# Patient Record
Sex: FEMALE | Race: WHITE | Employment: OTHER | URBAN - METROPOLITAN AREA
[De-identification: names, ages, dates, MRNs, and addresses within clinical notes are randomized per-mention and may not be internally consistent; named-entity substitution may affect disease eponyms.]

---

## 2021-12-27 RX ORDER — ATORVASTATIN CALCIUM 10 MG/1
10 TABLET, FILM COATED ORAL DAILY
COMMUNITY

## 2021-12-27 RX ORDER — PAROXETINE 10 MG/1
10 TABLET, FILM COATED ORAL EVERY MORNING
COMMUNITY

## 2021-12-27 RX ORDER — ALBUTEROL SULFATE 90 UG/1
2 AEROSOL, METERED RESPIRATORY (INHALATION) EVERY 6 HOURS PRN
COMMUNITY

## 2021-12-27 RX ORDER — MONTELUKAST SODIUM 10 MG/1
10 TABLET ORAL NIGHTLY
COMMUNITY

## 2021-12-27 RX ORDER — OMEPRAZOLE 20 MG/1
20 CAPSULE, DELAYED RELEASE ORAL 2 TIMES DAILY
COMMUNITY

## 2021-12-27 RX ORDER — ALBUTEROL SULFATE 0.63 MG/3ML
1 SOLUTION RESPIRATORY (INHALATION) EVERY 6 HOURS PRN
COMMUNITY
End: 2023-04-04 | Stop reason: ALTCHOICE

## 2022-01-13 ENCOUNTER — OFFICE VISIT (OUTPATIENT)
Dept: UROLOGY | Age: 72
End: 2022-01-13
Payer: MEDICARE

## 2022-01-13 ENCOUNTER — HOSPITAL ENCOUNTER (OUTPATIENT)
Age: 72
Setting detail: SPECIMEN
Discharge: HOME | End: 2022-01-13
Payer: MEDICARE

## 2022-01-13 VITALS
DIASTOLIC BLOOD PRESSURE: 76 MMHG | HEART RATE: 73 BPM | TEMPERATURE: 97.8 F | SYSTOLIC BLOOD PRESSURE: 163 MMHG | WEIGHT: 133 LBS | HEIGHT: 64 IN | BODY MASS INDEX: 22.71 KG/M2

## 2022-01-13 DIAGNOSIS — N39.0 FREQUENT UTI: ICD-10-CM

## 2022-01-13 DIAGNOSIS — R35.1 NOCTURIA: Primary | ICD-10-CM

## 2022-01-13 DIAGNOSIS — N39.41 URGE INCONTINENCE: ICD-10-CM

## 2022-01-13 DIAGNOSIS — R31.29 MICROSCOPIC HEMATURIA: ICD-10-CM

## 2022-01-13 DIAGNOSIS — R35.1 NOCTURIA: ICD-10-CM

## 2022-01-13 DIAGNOSIS — N32.81 OAB (OVERACTIVE BLADDER): ICD-10-CM

## 2022-01-13 LAB
-: ABNORMAL
AMORPHOUS: ABNORMAL
BACTERIA: ABNORMAL
BILIRUBIN URINE: NEGATIVE
CASTS UA: ABNORMAL /LPF
COLOR: YELLOW
COMMENT UA: ABNORMAL
CRYSTALS, UA: ABNORMAL /HPF
EPITHELIAL CELLS UA: ABNORMAL /HPF (ref 0–25)
GLUCOSE URINE: NEGATIVE
KETONES, URINE: NEGATIVE
LEUKOCYTE ESTERASE, URINE: NEGATIVE
MUCUS: ABNORMAL
NITRITE, URINE: NEGATIVE
OTHER OBSERVATIONS UA: ABNORMAL
PH UA: 6 (ref 5–9)
PROTEIN UA: NEGATIVE
RBC UA: ABNORMAL /HPF (ref 0–2)
RENAL EPITHELIAL, UA: ABNORMAL /HPF
SPECIFIC GRAVITY UA: 1.02 (ref 1.01–1.02)
TRICHOMONAS: ABNORMAL
TURBIDITY: CLEAR
URINE HGB: ABNORMAL
UROBILINOGEN, URINE: NORMAL
WBC UA: ABNORMAL /HPF (ref 0–5)
YEAST: ABNORMAL

## 2022-01-13 PROCEDURE — G8400 PT W/DXA NO RESULTS DOC: HCPCS | Performed by: NURSE PRACTITIONER

## 2022-01-13 PROCEDURE — 1036F TOBACCO NON-USER: CPT | Performed by: NURSE PRACTITIONER

## 2022-01-13 PROCEDURE — 3017F COLORECTAL CA SCREEN DOC REV: CPT | Performed by: NURSE PRACTITIONER

## 2022-01-13 PROCEDURE — 1090F PRES/ABSN URINE INCON ASSESS: CPT | Performed by: NURSE PRACTITIONER

## 2022-01-13 PROCEDURE — G8420 CALC BMI NORM PARAMETERS: HCPCS | Performed by: NURSE PRACTITIONER

## 2022-01-13 PROCEDURE — 1123F ACP DISCUSS/DSCN MKR DOCD: CPT | Performed by: NURSE PRACTITIONER

## 2022-01-13 PROCEDURE — 87086 URINE CULTURE/COLONY COUNT: CPT

## 2022-01-13 PROCEDURE — PBSHW PR MEASUREMENT,POST-VOID RESIDUAL VOLUME BY US,NON-IMAGING: Performed by: NURSE PRACTITIONER

## 2022-01-13 PROCEDURE — 0509F URINE INCON PLAN DOCD: CPT | Performed by: NURSE PRACTITIONER

## 2022-01-13 PROCEDURE — G8484 FLU IMMUNIZE NO ADMIN: HCPCS | Performed by: NURSE PRACTITIONER

## 2022-01-13 PROCEDURE — 51798 US URINE CAPACITY MEASURE: CPT | Performed by: NURSE PRACTITIONER

## 2022-01-13 PROCEDURE — 4040F PNEUMOC VAC/ADMIN/RCVD: CPT | Performed by: NURSE PRACTITIONER

## 2022-01-13 PROCEDURE — 81001 URINALYSIS AUTO W/SCOPE: CPT

## 2022-01-13 PROCEDURE — G8427 DOCREV CUR MEDS BY ELIG CLIN: HCPCS | Performed by: NURSE PRACTITIONER

## 2022-01-13 PROCEDURE — 99205 OFFICE O/P NEW HI 60 MIN: CPT | Performed by: NURSE PRACTITIONER

## 2022-01-13 RX ORDER — TROSPIUM CHLORIDE 20 MG/1
20 TABLET, FILM COATED ORAL NIGHTLY
Qty: 30 TABLET | Refills: 3 | Status: SHIPPED | OUTPATIENT
Start: 2022-01-13 | End: 2022-02-24 | Stop reason: SDUPTHER

## 2022-01-13 ASSESSMENT — ENCOUNTER SYMPTOMS
NAUSEA: 0
CONSTIPATION: 0
ABDOMINAL PAIN: 0
WHEEZING: 0
SHORTNESS OF BREATH: 0
VOMITING: 0
APNEA: 0
BACK PAIN: 0
EYE REDNESS: 0
COUGH: 0
COLOR CHANGE: 0

## 2022-01-13 NOTE — PROGRESS NOTES
HPI:        Patient is a 80 y.o. female in no acute distress. She is alert and oriented to person, place, and time. New patient referral from Javier NEWMAN for frequency UTIs. When she develops a UTI she does complain of pain with urination and bladder spasms. Urine cultures reviewed and summarized below:    Urine cultures  2021 - e.coli  11/15/2021 - citrobacter  2021 - e.coli  2021 - e.coli  2021 - e.coli    At baseline she does have frequency every 2 hours, nocturia x3-4 (new since July), and UUI. She denies MARKO. She does wear one pad per day. She denies gross hematuria. She does have daily BMs. She does have one cup of coffee in the mornings. Prior urology history reviewed and summarized below:    1975 cystoscopy (microscopic hematuria    1976 Hysterectomy (uterus and cervix only)    1981 Bladder suspension and cystoscopy by Dr. Queta Sanders    3/1993 Cystoscopy, \"stretched tubes\" by Dr. 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  Bilateral mastectomy and radiation - Dr. Moo Ledezma    1998 Cystoscopy, \"found to have medullary sponge kidney on left\", chronic cystitis, cystocele              Past Medical History:   Diagnosis Date    Acute bronchitis     Allergic rhinitis     Arthritis     Asthma     Hx of gastroesophageal reflux (GERD)     Hypertension     Menopause     Neoplasm     Malignant female breast    Nephrolithiasis      bowel perforation      Past Surgical History:   Procedure Laterality Date    BLADDER SUSPENSION      COLONOSCOPY      x 2 - [place] [facility]    CYSTOSCOPY      FINGER TRIGGER RELEASE      HYSTERECTOMY      KNEE ARTHROSCOPY      MASTECTOMY      OK SONO GUIDE NEEDLE BIOPSY      TUBAL LIGATION      UPPER GASTROINTESTINAL ENDOSCOPY       Outpatient Encounter Medications as of 2022   Medication Sig Dispense Refill    trospium (SANCTURA) 20 MG tablet Take 1 tablet by mouth nightly 30 tablet 3    albuterol (ACCUNEB) 0.63 MG/3ML nebulizer solution Take 1 ampule by nebulization every 6 hours as needed for Wheezing      albuterol sulfate  (90 Base) MCG/ACT inhaler Inhale 2 puffs into the lungs every 6 hours as needed for Wheezing      atorvastatin (LIPITOR) 10 MG tablet Take 10 mg by mouth daily      dilTIAZem HCl ER Coated Beads 360 MG TB24 Take by mouth      montelukast (SINGULAIR) 10 MG tablet Take 10 mg by mouth nightly      omeprazole (PRILOSEC) 20 MG delayed release capsule Take 20 mg by mouth daily      PARoxetine (PAXIL) 10 MG tablet Take 10 mg by mouth every morning       No facility-administered encounter medications on file as of 1/13/2022. Current Outpatient Medications on File Prior to Visit   Medication Sig Dispense Refill    albuterol (ACCUNEB) 0.63 MG/3ML nebulizer solution Take 1 ampule by nebulization every 6 hours as needed for Wheezing      albuterol sulfate  (90 Base) MCG/ACT inhaler Inhale 2 puffs into the lungs every 6 hours as needed for Wheezing      atorvastatin (LIPITOR) 10 MG tablet Take 10 mg by mouth daily      dilTIAZem HCl ER Coated Beads 360 MG TB24 Take by mouth      montelukast (SINGULAIR) 10 MG tablet Take 10 mg by mouth nightly      omeprazole (PRILOSEC) 20 MG delayed release capsule Take 20 mg by mouth daily      PARoxetine (PAXIL) 10 MG tablet Take 10 mg by mouth every morning       No current facility-administered medications on file prior to visit.      Avelox [moxifloxacin], Bactrim [sulfamethoxazole-trimethoprim], Celecoxib, Other, Sulfa antibiotics, and Diclofenac sodium  Family History   Problem Relation Age of Onset    Kidney Disease Father     Heart Disease Father     Dementia Mother     Cancer Paternal Aunt     Cancer Paternal Uncle     Cancer Brother     Other Brother     Cancer Daughter     Breast Cancer Daughter      Social History     Tobacco Use   Smoking Status Never Smoker   Smokeless Tobacco Never Used       Social History     Substance and Sexual Activity   Alcohol Use Not Currently       Review of Systems   Constitutional: Negative for appetite change, chills and fever. Eyes: Negative for redness and visual disturbance. Respiratory: Negative for apnea, cough, shortness of breath and wheezing. Cardiovascular: Negative for chest pain and leg swelling. Gastrointestinal: Negative for abdominal pain, constipation, nausea and vomiting. Genitourinary: Negative for difficulty urinating, dyspareunia, dysuria, enuresis, flank pain, frequency, hematuria, pelvic pain, urgency, vaginal bleeding and vaginal discharge. Musculoskeletal: Negative for back pain, joint swelling and myalgias. Skin: Negative for color change, rash and wound. Neurological: Negative for dizziness, tremors and numbness. Hematological: Negative for adenopathy. Does not bruise/bleed easily. Psychiatric/Behavioral: Negative for sleep disturbance. BP (!) 163/76 (Site: Right Upper Arm, Position: Sitting, Cuff Size: Medium Adult)   Pulse 73   Temp 97.8 °F (36.6 °C)   Ht 5' 4\" (1.626 m)   Wt 133 lb (60.3 kg)   LMP  (LMP Unknown)   BMI 22.83 kg/m²       PHYSICAL EXAM:  Constitutional: Patient resting comfortably, in no acute distress. Neuro: Alert and oriented to person place and time. Cranial nerves grossly intact. Psych: Mood and affect normal.  Skin: Warm, dry  HEENT: normocephalic, atraumatic  Lymphatics: No palpable lymphadenopathy  Lungs: Respiratory effort normal, unlabored  Cardiovascular:  Normal peripheral pulses  Abdomen: Soft, non-tender, non-distended with no organomegaly or palpable masses. : No CVA tenderness. Bladder non-tender and not distended. Pelvic: Deferred    No results found for: BUN  No results found for: CREATININE    ASSESSMENT:   Diagnosis Orders   1. Nocturia  [place] MEASUREMENT,POST-VOID RESIDUAL VOLUME BY US,NON-IMAGING    Urinalysis with Microscopic    Culture, Urine   2.  Urge incontinence  Urinalysis with Microscopic Culture, Urine   3. Frequent UTI  Urinalysis with Microscopic    Culture, Urine   4. Microscopic hematuria  Urinalysis with Microscopic    Culture, Urine   5. OAB (overactive bladder)             PLAN:  We will check a UA C&S due to history of microscopic hematuria. If this is positive for significant hematuria we will proceed with a CT urogram, then she will return for cystoscopy and pelvic. If UA is negative for significant hematuria we will obtain a renal ultrasound and KUB    We will start trospium at night. If she tolerates this medication and has improvement we can consider increasing this to twice per day to also address her urgency and urge incontinence. We will attempt to obtain oncology records and talk to oncologist to determine if we can use vaginal estrogen for UTI prevention    I did urge her to start cranberry extract to prevent urinary tract infections    Drink 80 ounces of water every day    Follow-up in 6 weeks to reevaluate urinary symptoms. Further follow-up will be based on urine results    I did urge her to call our office if she develops any signs and symptoms of a urinary tract infection.

## 2022-01-13 NOTE — PATIENT INSTRUCTIONS
Cranberry extract    Drink 80 ounces of water every day            SURVEY:    You may be receiving a survey from [employer] regarding your visit today. Please complete the survey to enable us to provide the highest quality of care to you and your family. If you cannot score us a very good on any question, please call the office to discuss how we could have made your experience a very good one. Thank you.   Karena

## 2022-01-14 LAB
CULTURE: NORMAL
Lab: NORMAL
SPECIMEN DESCRIPTION: NORMAL

## 2022-01-17 ENCOUNTER — TELEPHONE (OUTPATIENT)
Dept: UROLOGY | Age: 72
End: 2022-01-17

## 2022-01-17 DIAGNOSIS — R31.29 MICROSCOPIC HEMATURIA: Primary | ICD-10-CM

## 2022-01-17 NOTE — TELEPHONE ENCOUNTER
Patient made aware of urine results and recommendation to complete CT and cysto. Patient agreeable to scheduling.

## 2022-01-20 ENCOUNTER — HOSPITAL ENCOUNTER (OUTPATIENT)
Dept: CT IMAGING | Age: 72
Discharge: HOME | End: 2022-01-22
Payer: MEDICARE

## 2022-01-20 ENCOUNTER — HOSPITAL ENCOUNTER (OUTPATIENT)
Age: 72
Discharge: HOME | End: 2022-01-20
Payer: MEDICARE

## 2022-01-20 DIAGNOSIS — R31.29 MICROSCOPIC HEMATURIA: ICD-10-CM

## 2022-01-20 LAB
BUN BLDV-MCNC: 21 MG/DL (ref 8–23)
CREAT SERPL-MCNC: 0.66 MG/DL (ref 0.5–0.9)
GFR AFRICAN AMERICAN: >60 ML/MIN
GFR NON-AFRICAN AMERICAN: >60 ML/MIN
GFR SERPL CREATININE-BSD FRML MDRD: NORMAL ML/MIN/{1.73_M2}
GFR SERPL CREATININE-BSD FRML MDRD: NORMAL ML/MIN/{1.73_M2}

## 2022-01-20 PROCEDURE — 82565 ASSAY OF CREATININE: CPT

## 2022-01-20 PROCEDURE — 6360000004 HC RX CONTRAST MEDICATION: Performed by: NURSE PRACTITIONER

## 2022-01-20 PROCEDURE — 36415 COLL VENOUS BLD VENIPUNCTURE: CPT

## 2022-01-20 PROCEDURE — 74178 CT ABD&PLV WO CNTR FLWD CNTR: CPT

## 2022-01-20 PROCEDURE — 84520 ASSAY OF UREA NITROGEN: CPT

## 2022-01-20 RX ADMIN — IOPAMIDOL 120 ML: 755 INJECTION, SOLUTION INTRAVENOUS at 16:17

## 2022-02-07 ENCOUNTER — PROCEDURE VISIT (OUTPATIENT)
Dept: UROLOGY | Age: 72
End: 2022-02-07
Payer: MEDICARE

## 2022-02-07 VITALS
DIASTOLIC BLOOD PRESSURE: 74 MMHG | WEIGHT: 136 LBS | BODY MASS INDEX: 23.22 KG/M2 | TEMPERATURE: 96.9 F | HEART RATE: 86 BPM | HEIGHT: 64 IN | SYSTOLIC BLOOD PRESSURE: 166 MMHG

## 2022-02-07 DIAGNOSIS — R35.1 NOCTURIA: ICD-10-CM

## 2022-02-07 DIAGNOSIS — N39.41 URGE INCONTINENCE: ICD-10-CM

## 2022-02-07 DIAGNOSIS — R31.29 MICROSCOPIC HEMATURIA: Primary | ICD-10-CM

## 2022-02-07 DIAGNOSIS — N32.81 OAB (OVERACTIVE BLADDER): ICD-10-CM

## 2022-02-07 DIAGNOSIS — N39.0 FREQUENT UTI: ICD-10-CM

## 2022-02-07 PROCEDURE — G8484 FLU IMMUNIZE NO ADMIN: HCPCS | Performed by: UROLOGY

## 2022-02-07 PROCEDURE — 52000 CYSTOURETHROSCOPY: CPT | Performed by: UROLOGY

## 2022-02-07 PROCEDURE — 0509F URINE INCON PLAN DOCD: CPT | Performed by: UROLOGY

## 2022-02-07 PROCEDURE — 3017F COLORECTAL CA SCREEN DOC REV: CPT | Performed by: UROLOGY

## 2022-02-07 PROCEDURE — 1123F ACP DISCUSS/DSCN MKR DOCD: CPT | Performed by: UROLOGY

## 2022-02-07 PROCEDURE — 1090F PRES/ABSN URINE INCON ASSESS: CPT | Performed by: UROLOGY

## 2022-02-07 PROCEDURE — G8420 CALC BMI NORM PARAMETERS: HCPCS | Performed by: UROLOGY

## 2022-02-07 PROCEDURE — G8400 PT W/DXA NO RESULTS DOC: HCPCS | Performed by: UROLOGY

## 2022-02-07 PROCEDURE — 1036F TOBACCO NON-USER: CPT | Performed by: UROLOGY

## 2022-02-07 PROCEDURE — 99213 OFFICE O/P EST LOW 20 MIN: CPT | Performed by: UROLOGY

## 2022-02-07 PROCEDURE — 4040F PNEUMOC VAC/ADMIN/RCVD: CPT | Performed by: UROLOGY

## 2022-02-07 PROCEDURE — G8427 DOCREV CUR MEDS BY ELIG CLIN: HCPCS | Performed by: UROLOGY

## 2022-02-07 ASSESSMENT — ENCOUNTER SYMPTOMS
ABDOMINAL PAIN: 0
SHORTNESS OF BREATH: 0
NAUSEA: 0
BACK PAIN: 0
VOMITING: 0
EYE PAIN: 0
COUGH: 0
EYE REDNESS: 0
WHEEZING: 0
COLOR CHANGE: 0

## 2022-02-07 NOTE — PROGRESS NOTES
capsule Take 20 mg by mouth daily      PARoxetine (PAXIL) 10 MG tablet Take 10 mg by mouth every morning       No facility-administered encounter medications on file as of 2/7/2022. Current Outpatient Medications on File Prior to Visit   Medication Sig Dispense Refill    trospium (SANCTURA) 20 MG tablet Take 1 tablet by mouth nightly 30 tablet 3    albuterol (ACCUNEB) 0.63 MG/3ML nebulizer solution Take 1 ampule by nebulization every 6 hours as needed for Wheezing      albuterol sulfate  (90 Base) MCG/ACT inhaler Inhale 2 puffs into the lungs every 6 hours as needed for Wheezing      atorvastatin (LIPITOR) 10 MG tablet Take 10 mg by mouth daily      dilTIAZem HCl ER Coated Beads 360 MG TB24 Take by mouth      montelukast (SINGULAIR) 10 MG tablet Take 10 mg by mouth nightly      omeprazole (PRILOSEC) 20 MG delayed release capsule Take 20 mg by mouth daily      PARoxetine (PAXIL) 10 MG tablet Take 10 mg by mouth every morning       No current facility-administered medications on file prior to visit. Avelox [moxifloxacin], Bactrim [sulfamethoxazole-trimethoprim], Celecoxib, Other, Sulfa antibiotics, and Diclofenac sodium  Family History   Problem Relation Age of Onset    Kidney Disease Father     Heart Disease Father     Dementia Mother     Cancer Paternal Aunt     Cancer Paternal Uncle     Cancer Brother     Other Brother     Cancer Daughter     Breast Cancer Daughter      Social History     Tobacco Use   Smoking Status Never Smoker   Smokeless Tobacco Never Used       Social History     Substance and Sexual Activity   Alcohol Use Not Currently       Review of Systems   Constitutional: Negative for appetite change, chills and fever. Eyes: Negative for pain, redness and visual disturbance. Respiratory: Negative for cough, shortness of breath and wheezing. Cardiovascular: Negative for chest pain and leg swelling.    Gastrointestinal: Negative for abdominal pain, nausea and vomiting. Genitourinary: Negative for difficulty urinating, dysuria, flank pain, frequency, hematuria, pelvic pain, vaginal bleeding and vaginal discharge. Musculoskeletal: Negative for back pain, joint swelling and myalgias. Skin: Negative for color change, rash and wound. Neurological: Negative for dizziness, tremors and numbness. Hematological: Negative for adenopathy. Does not bruise/bleed easily. LMP  (LMP Unknown)       PHYSICAL EXAM:  Constitutional: Patient resting comfortably, in no acute distress. Neuro: Alert and oriented to person place and time. Cranial nerves grossly intact. Psych: Mood and affect normal.  Skin: Warm, dry  HEENT: normocephalic, atraumatic  Lymphatics: No palpable lymphadenopathy  Lungs: Respiratory effort normal, unlabored  Cardiovascular:  Normal peripheral pulses  Abdomen: Soft, non-tender, non-distended with no organomegaly or palpable masses. : No CVA tenderness. Bladder non-tender and not distended. Pelvic: Vaginal atrophy    Lab Results   Component Value Date    BUN 21 01/20/2022     Lab Results   Component Value Date    CREATININE 0.66 01/20/2022       ASSESSMENT:  This is a 80 y.o. female with the following diagnoses:   Diagnosis Orders   1. Microscopic hematuria  TN CYSTOURETHROSCOPY   2. Urge incontinence     3. Nocturia     4. Frequent UTI     5. OAB (overactive bladder)           PLAN:  Patient is clear from microscopic hematuria standpoint. She will continue trospium. She is scheduled to see us later on this month for efficacy.

## 2022-02-07 NOTE — PROGRESS NOTES
During cystoscopy the following was utilized on patient with no adverse affects:    45% SODIUM CHLORIDE 500 ML BAG  Lot number: [id number]  Expiration date: 11/2022      LIDOCAINE HYDROCHLORIDE JELLY 2%   Lot number: [id number]  Expiration date: 05/2023

## 2022-02-07 NOTE — PATIENT INSTRUCTIONS
SURVEY:    You may be receiving a survey from [employer] regarding your visit today. Please complete the survey to enable us to provide the highest quality of care to you and your family. If you cannot score us a very good on any question, please call the office to discuss how we could of made your experience a very good one. Thank you.

## 2022-02-24 ENCOUNTER — OFFICE VISIT (OUTPATIENT)
Dept: UROLOGY | Age: 72
End: 2022-02-24
Payer: MEDICARE

## 2022-02-24 VITALS
SYSTOLIC BLOOD PRESSURE: 160 MMHG | DIASTOLIC BLOOD PRESSURE: 71 MMHG | WEIGHT: 135 LBS | HEART RATE: 80 BPM | HEIGHT: 64 IN | TEMPERATURE: 96.8 F | BODY MASS INDEX: 23.05 KG/M2

## 2022-02-24 DIAGNOSIS — R35.1 NOCTURIA: Primary | ICD-10-CM

## 2022-02-24 DIAGNOSIS — N20.0 RENAL STONES: ICD-10-CM

## 2022-02-24 PROCEDURE — 4040F PNEUMOC VAC/ADMIN/RCVD: CPT | Performed by: NURSE PRACTITIONER

## 2022-02-24 PROCEDURE — 3017F COLORECTAL CA SCREEN DOC REV: CPT | Performed by: NURSE PRACTITIONER

## 2022-02-24 PROCEDURE — PBSHW PR MEASUREMENT,POST-VOID RESIDUAL VOLUME BY US,NON-IMAGING: Performed by: NURSE PRACTITIONER

## 2022-02-24 PROCEDURE — 1036F TOBACCO NON-USER: CPT | Performed by: NURSE PRACTITIONER

## 2022-02-24 PROCEDURE — 99212 OFFICE O/P EST SF 10 MIN: CPT | Performed by: NURSE PRACTITIONER

## 2022-02-24 PROCEDURE — 1090F PRES/ABSN URINE INCON ASSESS: CPT | Performed by: NURSE PRACTITIONER

## 2022-02-24 PROCEDURE — G8427 DOCREV CUR MEDS BY ELIG CLIN: HCPCS | Performed by: NURSE PRACTITIONER

## 2022-02-24 PROCEDURE — 1123F ACP DISCUSS/DSCN MKR DOCD: CPT | Performed by: NURSE PRACTITIONER

## 2022-02-24 PROCEDURE — 51798 US URINE CAPACITY MEASURE: CPT | Performed by: NURSE PRACTITIONER

## 2022-02-24 PROCEDURE — G8482 FLU IMMUNIZE ORDER/ADMIN: HCPCS | Performed by: NURSE PRACTITIONER

## 2022-02-24 PROCEDURE — G8400 PT W/DXA NO RESULTS DOC: HCPCS | Performed by: NURSE PRACTITIONER

## 2022-02-24 PROCEDURE — G8420 CALC BMI NORM PARAMETERS: HCPCS | Performed by: NURSE PRACTITIONER

## 2022-02-24 RX ORDER — TROSPIUM CHLORIDE 20 MG/1
20 TABLET, FILM COATED ORAL NIGHTLY
Qty: 30 TABLET | Refills: 3 | Status: SHIPPED | OUTPATIENT
Start: 2022-02-24 | End: 2022-04-14 | Stop reason: SDUPTHER

## 2022-02-24 ASSESSMENT — ENCOUNTER SYMPTOMS
ABDOMINAL PAIN: 0
EYE REDNESS: 0
WHEEZING: 0
VOMITING: 0
APNEA: 0
COLOR CHANGE: 0
CONSTIPATION: 0
SHORTNESS OF BREATH: 0
NAUSEA: 0
BACK PAIN: 0
COUGH: 0

## 2022-02-24 NOTE — PROGRESS NOTES
HPI:        Patient is a 80 y.o. female in no acute distress. She is alert and oriented to person, place, and time. History  Urine cultures  2021 - e.coli  11/15/2021 - citrobacter  2021 - e.coli  2021 - e.coli  2021 - e.coli    At baseline she does have frequency every 2 hours, nocturia x3-4 (new since July), and UUI. She denies MARKO. She does wear one pad per day. She denies gross hematuria. She does have daily BMs. She does have one cup of coffee in the mornings. Prior urology history reviewed and summarized below:    1975 cystoscopy (microscopic hematuria)    1976 Hysterectomy (uterus and cervix only)    1981 Bladder suspension and cystoscopy by Dr. Garza [facility]    3/1993 Cystoscopy, \"stretched tubes\" by Dr. 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 Bilateral mastectomy and radiation - Dr. Corcoran [facility]    1998 Cystoscopy, \"found to have medullary sponge kidney on left\", chronic cystitis, cystocele    2022 Referral from Lui NEWMAN for frequency UTIs. When she develops a UTI she does complain of pain with urination and bladder spasms. Started trospium Q    2022 microscopic hematuria. CT urogram showed bladder wall thickening and a small stone in the left kidney. Cystoscopy showed normal anatomy. Pelvic showed vaginal atrophy    Today  Here today to follow-up for nocturia and urge incontinence. We did start her on trospium at night. Experiencing any urge incontinence. She does continue to have nocturia 3-4 times per night. She is having daily bowel movements. She does consume fluids all the way up to bedtime. She denies any dysuria or gross hematuria. Continues to deny any daytime urinary symptoms.   Past Medical History:   Diagnosis Date    Acute bronchitis     Allergic rhinitis     Arthritis     Asthma     Hx of gastroesophageal reflux (GERD)     Hypertension     Menopause     Neoplasm     Malignant female breast    Nephrolithiasis      bowel perforation      Past Surgical History:   Procedure Laterality Date    BLADDER SUSPENSION      COLONOSCOPY      x 2 - [place] [facility]    CYSTOSCOPY      FINGER TRIGGER RELEASE      HYSTERECTOMY      KNEE ARTHROSCOPY      MASTECTOMY      GA SONO GUIDE NEEDLE BIOPSY      TUBAL LIGATION      UPPER GASTROINTESTINAL ENDOSCOPY       Outpatient Encounter Medications as of 2/24/2022   Medication Sig Dispense Refill    trospium (SANCTURA) 20 MG tablet Take 1 tablet by mouth nightly 30 tablet 3    albuterol (ACCUNEB) 0.63 MG/3ML nebulizer solution Take 1 ampule by nebulization every 6 hours as needed for Wheezing      albuterol sulfate  (90 Base) MCG/ACT inhaler Inhale 2 puffs into the lungs every 6 hours as needed for Wheezing      atorvastatin (LIPITOR) 10 MG tablet Take 10 mg by mouth daily      dilTIAZem HCl ER Coated Beads 360 MG TB24 Take by mouth      montelukast (SINGULAIR) 10 MG tablet Take 10 mg by mouth nightly      omeprazole (PRILOSEC) 20 MG delayed release capsule Take 20 mg by mouth daily      PARoxetine (PAXIL) 10 MG tablet Take 10 mg by mouth every morning      [DISCONTINUED] trospium (SANCTURA) 20 MG tablet Take 1 tablet by mouth nightly 30 tablet 3     No facility-administered encounter medications on file as of 2/24/2022.       Current Outpatient Medications on File Prior to Visit   Medication Sig Dispense Refill    albuterol (ACCUNEB) 0.63 MG/3ML nebulizer solution Take 1 ampule by nebulization every 6 hours as needed for Wheezing      albuterol sulfate  (90 Base) MCG/ACT inhaler Inhale 2 puffs into the lungs every 6 hours as needed for Wheezing      atorvastatin (LIPITOR) 10 MG tablet Take 10 mg by mouth daily      dilTIAZem HCl ER Coated Beads 360 MG TB24 Take by mouth      montelukast (SINGULAIR) 10 MG tablet Take 10 mg by mouth nightly      omeprazole (PRILOSEC) 20 MG delayed release capsule Take 20 mg by mouth daily      PARoxetine (PAXIL) 10 grossly intact. Psych: Mood and affect normal.  Skin: Warm, dry  HEENT: normocephalic, atraumatic  Lymphatics: No palpable lymphadenopathy  Lungs: Respiratory effort normal, unlabored  Cardiovascular:  Normal peripheral pulses  Abdomen: Soft, non-tender, non-distended with no organomegaly or palpable masses. : No CVA tenderness. Bladder non-tender and not distended. Pelvic: Deferred    Lab Results   Component Value Date    BUN 21 01/20/2022     Lab Results   Component Value Date    CREATININE 0.66 01/20/2022       ASSESSMENT:   Diagnosis Orders   1. Nocturia  LA MEASUREMENT,POST-VOID RESIDUAL VOLUME BY US,NON-IMAGING   2.  Renal stones  XR ABDOMEN (KUB) (SINGLE AP VIEW)           PLAN:  She will take trospium at 6-7 at night    Stop fluids 2 hours prior to bedtime    Follow-up in 6-8 weeks for reevaluation    KUB due in 1/2023

## 2022-04-14 ENCOUNTER — OFFICE VISIT (OUTPATIENT)
Dept: UROLOGY | Age: 72
End: 2022-04-14
Payer: MEDICARE

## 2022-04-14 VITALS
TEMPERATURE: 97.8 F | SYSTOLIC BLOOD PRESSURE: 139 MMHG | HEIGHT: 64 IN | RESPIRATION RATE: 16 BRPM | DIASTOLIC BLOOD PRESSURE: 71 MMHG | BODY MASS INDEX: 22.77 KG/M2 | WEIGHT: 133.4 LBS | HEART RATE: 76 BPM

## 2022-04-14 DIAGNOSIS — R35.1 NOCTURIA: Primary | ICD-10-CM

## 2022-04-14 PROCEDURE — G8400 PT W/DXA NO RESULTS DOC: HCPCS | Performed by: NURSE PRACTITIONER

## 2022-04-14 PROCEDURE — 4040F PNEUMOC VAC/ADMIN/RCVD: CPT | Performed by: NURSE PRACTITIONER

## 2022-04-14 PROCEDURE — G8420 CALC BMI NORM PARAMETERS: HCPCS | Performed by: NURSE PRACTITIONER

## 2022-04-14 PROCEDURE — 99211 OFF/OP EST MAY X REQ PHY/QHP: CPT

## 2022-04-14 PROCEDURE — G8427 DOCREV CUR MEDS BY ELIG CLIN: HCPCS | Performed by: NURSE PRACTITIONER

## 2022-04-14 PROCEDURE — 1123F ACP DISCUSS/DSCN MKR DOCD: CPT | Performed by: NURSE PRACTITIONER

## 2022-04-14 PROCEDURE — 3017F COLORECTAL CA SCREEN DOC REV: CPT | Performed by: NURSE PRACTITIONER

## 2022-04-14 PROCEDURE — 99212 OFFICE O/P EST SF 10 MIN: CPT | Performed by: NURSE PRACTITIONER

## 2022-04-14 PROCEDURE — 1090F PRES/ABSN URINE INCON ASSESS: CPT | Performed by: NURSE PRACTITIONER

## 2022-04-14 PROCEDURE — 1036F TOBACCO NON-USER: CPT | Performed by: NURSE PRACTITIONER

## 2022-04-14 RX ORDER — TROSPIUM CHLORIDE 20 MG/1
20 TABLET, FILM COATED ORAL NIGHTLY
Qty: 90 TABLET | Refills: 3 | Status: SHIPPED | OUTPATIENT
Start: 2022-04-14 | End: 2022-10-18 | Stop reason: SDUPTHER

## 2022-04-14 ASSESSMENT — ENCOUNTER SYMPTOMS
VOMITING: 0
CONSTIPATION: 0
COLOR CHANGE: 0
APNEA: 0
EYE REDNESS: 0
SHORTNESS OF BREATH: 0
NAUSEA: 0
BACK PAIN: 0
COUGH: 0
WHEEZING: 0
ABDOMINAL PAIN: 0

## 2022-04-14 NOTE — PATIENT INSTRUCTIONS
SURVEY:    You may be receiving a survey from [employer] regarding your visit today. Please complete the survey to enable us to provide the highest quality of care to you and your family. If you cannot score us a very good on any question, please call the office to discuss how we could have made your experience a very good one. Thank you.

## 2022-04-14 NOTE — PROGRESS NOTES
HPI:        Patient is a 80 y.o. female in no acute distress. She is alert and oriented to person, place, and time. History  Urine cultures  12/2021 - e.coli  11/15/2021 - citrobacter  11/8/2021 - e.coli  7/23/2021 - e.coli  7/13/2021 - e.coli    At baseline she does have frequency every 2 hours, nocturia x3-4 (new since July), and UUI. She denies MARKO. She does wear one pad per day. She denies gross hematuria. She does have daily BMs. She does have one cup of coffee in the mornings. Prior urology history reviewed and summarized below:    8/1975 cystoscopy (microscopic hematuria)    5/1976 Hysterectomy (uterus and cervix only)    2/1981 Bladder suspension and cystoscopy by Dr. Reyes Rausch    3/1993 Cystoscopy, \"stretched tubes\" by Dr. 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 Bilateral mastectomy and radiation - Dr. Navid Ritchie    12/1998 Cystoscopy, \"found to have medullary sponge kidney on left\", chronic cystitis, cystocele    1/2022 Referral from [facility] for frequency UTIs. When she develops a UTI she does complain of pain with urination and bladder spasms. Started trospium QHS    2/2022 microscopic hematuria. CT urogram showed bladder wall thickening and a small stone in the left kidney. Cystoscopy showed normal anatomy. Pelvic showed vaginal atrophy. Continued to have nocturia 3-4 times per night despite trospium. She will take trospium at 6-7 at night     Stop fluids 2 hours prior to bedtime      Today  Here today to follow-up for nocturia and urge incontinence. She is taking trospium at 6-7:00 at night. She is stopping fluids 2 hours prior to bedtime. Nocturia has improved significantly. She is only getting up 0-1 time per night. She denies any urgency or urge incontinence. She is very happy with her lower urinary tract symptoms. She has having daily bowel movements. She denies any dysuria or gross hematuria.   Past Medical History:   Diagnosis Date    Acute bronchitis     Allergic rhinitis     Arthritis     Asthma     Hx of gastroesophageal reflux (GERD)     Hypertension     Menopause     Neoplasm     Malignant female breast    Nephrolithiasis      bowel perforation      Past Surgical History:   Procedure Laterality Date    BLADDER SUSPENSION      COLONOSCOPY      x 2 - [facility]    CYSTOSCOPY      FINGER TRIGGER RELEASE      HYSTERECTOMY      KNEE ARTHROSCOPY      MASTECTOMY      TX SONO GUIDE NEEDLE BIOPSY      TUBAL LIGATION      UPPER GASTROINTESTINAL ENDOSCOPY       Outpatient Encounter Medications as of 2022   Medication Sig Dispense Refill    trospium (SANCTURA) 20 MG tablet Take 1 tablet by mouth nightly 90 tablet 3    albuterol (ACCUNEB) 0.63 MG/3ML nebulizer solution Take 1 ampule by nebulization every 6 hours as needed for Wheezing      albuterol sulfate  (90 Base) MCG/ACT inhaler Inhale 2 puffs into the lungs every 6 hours as needed for Wheezing      atorvastatin (LIPITOR) 10 MG tablet Take 10 mg by mouth daily      dilTIAZem HCl ER Coated Beads 360 MG TB24 Take by mouth daily       montelukast (SINGULAIR) 10 MG tablet Take 10 mg by mouth nightly      omeprazole (PRILOSEC) 20 MG delayed release capsule Take 20 mg by mouth in the morning and at bedtime       PARoxetine (PAXIL) 10 MG tablet Take 10 mg by mouth every morning      [DISCONTINUED] trospium (SANCTURA) 20 MG tablet Take 1 tablet by mouth nightly 30 tablet 3     No facility-administered encounter medications on file as of 2022.       Current Outpatient Medications on File Prior to Visit   Medication Sig Dispense Refill    albuterol (ACCUNEB) 0.63 MG/3ML nebulizer solution Take 1 ampule by nebulization every 6 hours as needed for Wheezing      albuterol sulfate  (90 Base) MCG/ACT inhaler Inhale 2 puffs into the lungs every 6 hours as needed for Wheezing      atorvastatin (LIPITOR) 10 MG tablet Take 10 mg by mouth daily      dilTIAZem HCl ER Coated Beads 360 MG TB24 Take by mouth daily       montelukast (SINGULAIR) 10 MG tablet Take 10 mg by mouth nightly      omeprazole (PRILOSEC) 20 MG delayed release capsule Take 20 mg by mouth in the morning and at bedtime       PARoxetine (PAXIL) 10 MG tablet Take 10 mg by mouth every morning       No current facility-administered medications on file prior to visit. Avelox [moxifloxacin], Bactrim [sulfamethoxazole-trimethoprim], Celecoxib, Other, Sulfa antibiotics, and Diclofenac sodium  Family History   Problem Relation Age of Onset    Kidney Disease Father     Heart Disease Father     Dementia Mother     Cancer Paternal Aunt     Cancer Paternal Uncle     Cancer Brother     Other Brother     Cancer Daughter     Breast Cancer Daughter      Social History     Tobacco Use   Smoking Status Never Smoker   Smokeless Tobacco Never Used       Social History     Substance and Sexual Activity   Alcohol Use Not Currently       Review of Systems   Constitutional: Negative for appetite change, chills and fever. Eyes: Negative for redness and visual disturbance. Respiratory: Negative for apnea, cough, shortness of breath and wheezing. Cardiovascular: Negative for chest pain and leg swelling. Gastrointestinal: Negative for abdominal pain, constipation, nausea and vomiting. Genitourinary: Negative for difficulty urinating, dyspareunia, dysuria, enuresis, flank pain, frequency, hematuria, pelvic pain, urgency, vaginal bleeding and vaginal discharge. Musculoskeletal: Negative for back pain, joint swelling and myalgias. Skin: Negative for color change, rash and wound. Neurological: Negative for dizziness, tremors and numbness. Hematological: Negative for adenopathy. Does not bruise/bleed easily. Psychiatric/Behavioral: Negative for sleep disturbance.        /71 (Site: Right Upper Arm, Position: Sitting, Cuff Size: Medium Adult)   Pulse 76   Temp 97.8 °F (36.6 °C) (Temporal)   Resp 16 Ht 5' 4\" (1.626 m)   Wt 133 lb 6.4 oz (60.5 kg)   LMP  (LMP Unknown)   BMI 22.90 kg/m²       PHYSICAL EXAM:  Constitutional: Patient resting comfortably, in no acute distress. Neuro: Alert and oriented to person place and time. Cranial nerves grossly intact. Psych: Mood and affect normal.  Skin: Warm, dry  HEENT: normocephalic, atraumatic  Lymphatics: No palpable lymphadenopathy  Lungs: Respiratory effort normal, unlabored  Cardiovascular:  Normal peripheral pulses  Abdomen: Soft, non-tender, non-distended with no organomegaly or palpable masses. : No CVA tenderness. Bladder non-tender and not distended. Pelvic: Deferred    Lab Results   Component Value Date    BUN 21 01/20/2022     Lab Results   Component Value Date    CREATININE 0.66 01/20/2022       ASSESSMENT:   Diagnosis Orders   1.  Nocturia             PLAN:  She will take trospium at 6-7 at night    Stop fluids 2 hours prior to bedtime      F/U in 6 months or sooner if needed

## 2022-10-18 ENCOUNTER — OFFICE VISIT (OUTPATIENT)
Dept: UROLOGY | Age: 72
End: 2022-10-18
Payer: MEDICARE

## 2022-10-18 VITALS
HEART RATE: 67 BPM | WEIGHT: 130 LBS | DIASTOLIC BLOOD PRESSURE: 65 MMHG | SYSTOLIC BLOOD PRESSURE: 145 MMHG | BODY MASS INDEX: 22.31 KG/M2

## 2022-10-18 DIAGNOSIS — N20.0 RENAL STONES: ICD-10-CM

## 2022-10-18 DIAGNOSIS — N39.0 FREQUENT UTI: ICD-10-CM

## 2022-10-18 DIAGNOSIS — R31.29 MICROSCOPIC HEMATURIA: ICD-10-CM

## 2022-10-18 DIAGNOSIS — R35.1 NOCTURIA: Primary | ICD-10-CM

## 2022-10-18 PROCEDURE — G8484 FLU IMMUNIZE NO ADMIN: HCPCS | Performed by: NURSE PRACTITIONER

## 2022-10-18 PROCEDURE — 1123F ACP DISCUSS/DSCN MKR DOCD: CPT | Performed by: NURSE PRACTITIONER

## 2022-10-18 PROCEDURE — 1090F PRES/ABSN URINE INCON ASSESS: CPT | Performed by: NURSE PRACTITIONER

## 2022-10-18 PROCEDURE — 3017F COLORECTAL CA SCREEN DOC REV: CPT | Performed by: NURSE PRACTITIONER

## 2022-10-18 PROCEDURE — 51798 US URINE CAPACITY MEASURE: CPT | Performed by: NURSE PRACTITIONER

## 2022-10-18 PROCEDURE — 99213 OFFICE O/P EST LOW 20 MIN: CPT | Performed by: NURSE PRACTITIONER

## 2022-10-18 PROCEDURE — G8420 CALC BMI NORM PARAMETERS: HCPCS | Performed by: NURSE PRACTITIONER

## 2022-10-18 PROCEDURE — G8427 DOCREV CUR MEDS BY ELIG CLIN: HCPCS | Performed by: NURSE PRACTITIONER

## 2022-10-18 PROCEDURE — G8400 PT W/DXA NO RESULTS DOC: HCPCS | Performed by: NURSE PRACTITIONER

## 2022-10-18 PROCEDURE — PBSHW PR MEASUREMENT,POST-VOID RESIDUAL VOLUME BY US,NON-IMAGING: Performed by: NURSE PRACTITIONER

## 2022-10-18 PROCEDURE — 1036F TOBACCO NON-USER: CPT | Performed by: NURSE PRACTITIONER

## 2022-10-18 RX ORDER — TROSPIUM CHLORIDE 20 MG/1
20 TABLET, FILM COATED ORAL NIGHTLY
Qty: 90 TABLET | Refills: 3 | Status: SHIPPED | OUTPATIENT
Start: 2022-10-18

## 2022-10-18 RX ORDER — PREDNISONE 20 MG/1
20 TABLET ORAL DAILY
COMMUNITY
End: 2022-10-20

## 2022-10-18 ASSESSMENT — ENCOUNTER SYMPTOMS
COUGH: 0
BACK PAIN: 0
WHEEZING: 0
NAUSEA: 0
EYE REDNESS: 0
VOMITING: 0
CONSTIPATION: 0
APNEA: 0
ABDOMINAL PAIN: 0
COLOR CHANGE: 0
SHORTNESS OF BREATH: 0

## 2022-10-18 NOTE — PROGRESS NOTES
HPI:        Patient is a 71 y.o. female in no acute distress. She is alert and oriented to person, place, and time. History  Urine cultures  12/2021 - e.coli  11/15/2021 - citrobacter  11/8/2021 - e.coli  7/23/2021 - e.coli  7/13/2021 - e.coli    At baseline she does have frequency every 2 hours, nocturia x3-4 (new since July), and UUI. She denies MARKO. She does wear one pad per day. She denies gross hematuria. She does have daily BMs. She does have one cup of coffee in the mornings. Prior urology history reviewed and summarized below:    8/1975 cystoscopy (microscopic hematuria)    5/1976 Hysterectomy (uterus and cervix only)    2/1981 Bladder suspension and cystoscopy by Dr. Gayathri Perry    3/1993 Cystoscopy, \"stretched tubes\" by Dr. 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 Bilateral mastectomy and radiation - Dr. Linda Jennings    12/1998 Cystoscopy, \"found to have medullary sponge kidney on left\", chronic cystitis, cystocele    1/2022 Referral from Gilberto NEWMAN for frequency UTIs. When she develops a UTI she does complain of pain with urination and bladder spasms. Started trospium Q    2/2022 microscopic hematuria. CT urogram showed bladder wall thickening and a small stone in the left kidney. Cystoscopy showed normal anatomy. Pelvic showed vaginal atrophy. Continued to have nocturia 3-4 times per night despite trospium. She will take trospium at 6-7 at night     Stop fluids 2 hours prior to bedtime      Today  Here today to follow-up for nocturia and urge incontinence. She is taking trospium at 6-7:00 at night. She is stopping fluids 2 hours prior to bedtime. She is only getting up 0-1 time per night. She denies any urgency or urge incontinence. She is very happy with her lower urinary tract symptoms. She has having daily bowel movements. She denies any dysuria or gross hematuria.     Past Medical History:   Diagnosis Date    Acute bronchitis     Allergic rhinitis     Arthritis     Asthma     Hx of gastroesophageal reflux (GERD)     Hypertension     Menopause     Neoplasm     Malignant female breast    Nephrolithiasis      bowel perforation      Past Surgical History:   Procedure Laterality Date    BLADDER SUSPENSION      COLONOSCOPY      x 2 - [facility]    CYSTOSCOPY      FINGER TRIGGER RELEASE      HYSTERECTOMY (CERVIX STATUS UNKNOWN)      KNEE ARTHROSCOPY      MASTECTOMY      NM SONO GUIDE NEEDLE BIOPSY      TUBAL LIGATION      UPPER GASTROINTESTINAL ENDOSCOPY       Outpatient Encounter Medications as of 10/18/2022   Medication Sig Dispense Refill    predniSONE (DELTASONE) 20 MG tablet Take 20 mg by mouth daily      trospium (SANCTURA) 20 MG tablet Take 1 tablet by mouth nightly 90 tablet 3    albuterol (ACCUNEB) 0.63 MG/3ML nebulizer solution Take 1 ampule by nebulization every 6 hours as needed for Wheezing      albuterol sulfate  (90 Base) MCG/ACT inhaler Inhale 2 puffs into the lungs every 6 hours as needed for Wheezing      atorvastatin (LIPITOR) 10 MG tablet Take 10 mg by mouth daily      dilTIAZem HCl ER Coated Beads 360 MG TB24 Take by mouth daily       montelukast (SINGULAIR) 10 MG tablet Take 10 mg by mouth nightly      omeprazole (PRILOSEC) 20 MG delayed release capsule Take 20 mg by mouth in the morning and at bedtime       PARoxetine (PAXIL) 10 MG tablet Take 10 mg by mouth every morning      [DISCONTINUED] trospium (SANCTURA) 20 MG tablet Take 1 tablet by mouth nightly 90 tablet 3     No facility-administered encounter medications on file as of 10/18/2022.       Current Outpatient Medications on File Prior to Visit   Medication Sig Dispense Refill    predniSONE (DELTASONE) 20 MG tablet Take 20 mg by mouth daily      albuterol (ACCUNEB) 0.63 MG/3ML nebulizer solution Take 1 ampule by nebulization every 6 hours as needed for Wheezing      albuterol sulfate  (90 Base) MCG/ACT inhaler Inhale 2 puffs into the lungs every 6 hours as needed for Wheezing      atorvastatin (LIPITOR) 10 MG tablet Take 10 mg by mouth daily      dilTIAZem HCl ER Coated Beads 360 MG TB24 Take by mouth daily       montelukast (SINGULAIR) 10 MG tablet Take 10 mg by mouth nightly      omeprazole (PRILOSEC) 20 MG delayed release capsule Take 20 mg by mouth in the morning and at bedtime       PARoxetine (PAXIL) 10 MG tablet Take 10 mg by mouth every morning       No current facility-administered medications on file prior to visit. Avelox [moxifloxacin], Bactrim [sulfamethoxazole-trimethoprim], Celecoxib, Other, Sulfa antibiotics, and Diclofenac sodium  Family History   Problem Relation Age of Onset    Kidney Disease Father     Heart Disease Father     Dementia Mother     Cancer Paternal Aunt     Cancer Paternal Uncle     Cancer Brother     Other Brother     Cancer Daughter     Breast Cancer Daughter      Social History     Tobacco Use   Smoking Status Never   Smokeless Tobacco Never       Social History     Substance and Sexual Activity   Alcohol Use Not Currently       Review of Systems   Constitutional:  Negative for appetite change, chills and fever. Eyes:  Negative for redness and visual disturbance. Respiratory:  Negative for apnea, cough, shortness of breath and wheezing. Cardiovascular:  Negative for chest pain and leg swelling. Gastrointestinal:  Negative for abdominal pain, constipation, nausea and vomiting. Genitourinary:  Negative for difficulty urinating, dyspareunia, dysuria, enuresis, flank pain, frequency, hematuria, pelvic pain, urgency, vaginal bleeding and vaginal discharge. Musculoskeletal:  Negative for back pain, joint swelling and myalgias. Skin:  Negative for color change, rash and wound. Neurological:  Negative for dizziness, tremors and numbness. Hematological:  Negative for adenopathy. Does not bruise/bleed easily. Psychiatric/Behavioral:  Negative for sleep disturbance.       BP (!) 145/65 (Site: Right Upper Arm, Position: Sitting, Cuff Size: Large Adult)   Pulse 67   Wt 130 lb (59 kg)   LMP  (LMP Unknown)   BMI 22.31 kg/m²       PHYSICAL EXAM:  Constitutional: Patient resting comfortably, in no acute distress. Neuro: Alert and oriented to person place and time. Cranial nerves grossly intact. Psych: Mood and affect normal.  Skin: Warm, dry  HEENT: normocephalic, atraumatic  Lymphatics: No palpable lymphadenopathy  Lungs: Respiratory effort normal, unlabored  Cardiovascular:  Normal peripheral pulses  Abdomen: Soft, non-tender, non-distended with no organomegaly or palpable masses. : No CVA tenderness. Bladder non-tender and not distended. Pelvic: Deferred    Lab Results   Component Value Date    BUN 21 01/20/2022     Lab Results   Component Value Date    CREATININE 0.66 01/20/2022       ASSESSMENT:   Diagnosis Orders   1. Nocturia  WA MEASUREMENT,POST-VOID RESIDUAL VOLUME BY US,NON-IMAGING      2. Microscopic hematuria        3. Frequent UTI        4.  Renal stones  XR ABDOMEN (KUB) (SINGLE AP VIEW)              PLAN:  Continue trospium in the evening    F/U in one year with a KUB prior or sooner if needed

## 2023-03-14 ENCOUNTER — TELEPHONE (OUTPATIENT)
Dept: UROLOGY | Age: 73
End: 2023-03-14

## 2023-03-14 DIAGNOSIS — R35.1 NOCTURIA: Primary | ICD-10-CM

## 2023-03-15 ENCOUNTER — HOSPITAL ENCOUNTER (OUTPATIENT)
Age: 73
Discharge: HOME | End: 2023-03-15
Payer: MEDICARE

## 2023-03-15 DIAGNOSIS — R35.1 NOCTURIA: ICD-10-CM

## 2023-03-15 PROCEDURE — 87086 URINE CULTURE/COLONY COUNT: CPT

## 2023-03-16 ENCOUNTER — TELEPHONE (OUTPATIENT)
Dept: UROLOGY | Age: 73
End: 2023-03-16

## 2023-03-16 LAB
MICROORGANISM SPEC CULT: NO GROWTH
SPECIMEN DESCRIPTION: NORMAL

## 2023-03-16 NOTE — TELEPHONE ENCOUNTER
----- Message from TRENT Israle - CNP sent at 3/16/2023  1:51 PM EDT -----  Call pt - urine cx reviewed and negative for UTI.  Please schedule in the office in the next few weeks

## 2023-03-16 NOTE — RESULT ENCOUNTER NOTE
Call pt - urine cx reviewed and negative for UTI.  Please schedule in the office in the next few weeks

## 2023-04-04 ENCOUNTER — OFFICE VISIT (OUTPATIENT)
Dept: UROLOGY | Age: 73
End: 2023-04-04

## 2023-04-04 VITALS
BODY MASS INDEX: 21.97 KG/M2 | SYSTOLIC BLOOD PRESSURE: 126 MMHG | DIASTOLIC BLOOD PRESSURE: 73 MMHG | WEIGHT: 128 LBS | HEART RATE: 88 BPM | TEMPERATURE: 98.4 F

## 2023-04-04 DIAGNOSIS — N20.0 RENAL STONES: ICD-10-CM

## 2023-04-04 DIAGNOSIS — R35.1 NOCTURIA: Primary | ICD-10-CM

## 2023-04-04 DIAGNOSIS — N32.81 OAB (OVERACTIVE BLADDER): ICD-10-CM

## 2023-04-04 DIAGNOSIS — N39.41 URGE INCONTINENCE: ICD-10-CM

## 2023-04-04 ASSESSMENT — ENCOUNTER SYMPTOMS
CONSTIPATION: 0
APNEA: 0
WHEEZING: 0
ABDOMINAL PAIN: 0
NAUSEA: 0
COUGH: 0
COLOR CHANGE: 0
BACK PAIN: 0
VOMITING: 0
EYE REDNESS: 0
SHORTNESS OF BREATH: 0

## 2023-04-04 NOTE — PROGRESS NOTES
Upper Arm, Position: Sitting, Cuff Size: Medium Adult)   Pulse 88   Temp 98.4 °F (36.9 °C)   Wt 128 lb (58.1 kg)   LMP  (LMP Unknown)   BMI 21.97 kg/m²       PHYSICAL EXAM:  Constitutional: Patient resting comfortably, in no acute distress. Neuro: Alert and oriented to person place and time. Cranial nerves grossly intact. Psych: Mood and affect normal.  Skin: Warm, dry  HEENT: normocephalic, atraumatic  Lymphatics: No palpable lymphadenopathy  Lungs: Respiratory effort normal, unlabored  Cardiovascular:  Normal peripheral pulses  Abdomen: Soft, non-tender, non-distended with no organomegaly or palpable masses. : No CVA tenderness. Bladder non-tender and not distended. Lab Results   Component Value Date    BUN 21 01/20/2022     Lab Results   Component Value Date    CREATININE 0.66 01/20/2022       ASSESSMENT:   Diagnosis Orders   1. Nocturia        2. OAB (overactive bladder)        3. Urge incontinence        4. Renal stones                PLAN:  She may have passed a kidney stone, but I do not have any other explanation for worsening urinary symptoms that have now since resolved. For now, she will continue trospium in the evening. She does have an appointment scheduled in October with a KUB prior. We will keep this appointment as planned. She will call sooner if needed for any new or worsening symptoms.

## 2023-04-04 NOTE — PATIENT INSTRUCTIONS
SURVEY:    You may be receiving a survey from [employer] regarding your visit today. Please complete the survey to enable us to provide the highest quality of care to you and your family. If you cannot score us a very good on any question, please call the office to discuss how we could have made your experience a very good one. Thank you.

## 2023-10-13 ENCOUNTER — HOSPITAL ENCOUNTER (OUTPATIENT)
Dept: GENERAL RADIOLOGY | Age: 73
End: 2023-10-13
Payer: MEDICARE

## 2023-10-13 ENCOUNTER — HOSPITAL ENCOUNTER (OUTPATIENT)
Age: 73
End: 2023-10-13
Payer: MEDICARE

## 2023-10-13 DIAGNOSIS — N20.0 RENAL STONES: ICD-10-CM

## 2023-10-13 PROCEDURE — 74018 RADEX ABDOMEN 1 VIEW: CPT

## 2023-10-20 ENCOUNTER — OFFICE VISIT (OUTPATIENT)
Dept: UROLOGY | Age: 73
End: 2023-10-20
Payer: MEDICARE

## 2023-10-20 ENCOUNTER — HOSPITAL ENCOUNTER (OUTPATIENT)
Age: 73
Setting detail: SPECIMEN
Discharge: HOME | End: 2023-10-20
Payer: MEDICARE

## 2023-10-20 VITALS
WEIGHT: 128 LBS | TEMPERATURE: 96.9 F | HEART RATE: 67 BPM | RESPIRATION RATE: 18 BRPM | HEIGHT: 64 IN | BODY MASS INDEX: 21.85 KG/M2 | DIASTOLIC BLOOD PRESSURE: 75 MMHG | SYSTOLIC BLOOD PRESSURE: 129 MMHG

## 2023-10-20 DIAGNOSIS — N20.0 RENAL STONES: ICD-10-CM

## 2023-10-20 DIAGNOSIS — R31.0 GROSS HEMATURIA: ICD-10-CM

## 2023-10-20 DIAGNOSIS — N32.81 OAB (OVERACTIVE BLADDER): ICD-10-CM

## 2023-10-20 DIAGNOSIS — R35.1 NOCTURIA: Primary | ICD-10-CM

## 2023-10-20 DIAGNOSIS — N39.0 FREQUENT UTI: ICD-10-CM

## 2023-10-20 DIAGNOSIS — N39.41 URGE INCONTINENCE: ICD-10-CM

## 2023-10-20 LAB
BILIRUB UR QL STRIP: NEGATIVE
CLARITY UR: CLEAR
COLOR UR: YELLOW
EPI CELLS #/AREA URNS HPF: ABNORMAL /HPF (ref 0–25)
GLUCOSE UR STRIP-MCNC: NEGATIVE MG/DL
HGB UR QL STRIP.AUTO: ABNORMAL
KETONES UR STRIP-MCNC: ABNORMAL MG/DL
LEUKOCYTE ESTERASE UR QL STRIP: NEGATIVE
MUCOUS THREADS URNS QL MICRO: ABNORMAL
NITRITE UR QL STRIP: NEGATIVE
PH UR STRIP: 6 [PH] (ref 5–9)
PROT UR STRIP-MCNC: NEGATIVE MG/DL
RBC #/AREA URNS HPF: ABNORMAL /HPF (ref 0–2)
SP GR UR STRIP: 1.02 (ref 1.01–1.02)
UROBILINOGEN UR STRIP-ACNC: NORMAL EU/DL (ref 0–1)
WBC #/AREA URNS HPF: ABNORMAL /HPF (ref 0–5)

## 2023-10-20 PROCEDURE — 87086 URINE CULTURE/COLONY COUNT: CPT

## 2023-10-20 PROCEDURE — 51798 US URINE CAPACITY MEASURE: CPT | Performed by: NURSE PRACTITIONER

## 2023-10-20 PROCEDURE — 81001 URINALYSIS AUTO W/SCOPE: CPT

## 2023-10-20 RX ORDER — TROSPIUM CHLORIDE 20 MG/1
20 TABLET, FILM COATED ORAL NIGHTLY
Qty: 90 TABLET | Refills: 3 | Status: SHIPPED | OUTPATIENT
Start: 2023-10-20

## 2023-10-20 RX ORDER — ESTRADIOL 0.1 MG/G
CREAM VAGINAL
Qty: 42.5 G | Refills: 3 | Status: SHIPPED | OUTPATIENT
Start: 2023-10-20

## 2023-10-20 RX ORDER — CALCIUM CARBONATE 500(1250)
500 TABLET ORAL DAILY
COMMUNITY

## 2023-10-20 RX ORDER — CHOLECALCIFEROL (VITAMIN D3) 1250 MCG
CAPSULE ORAL
COMMUNITY

## 2023-10-20 ASSESSMENT — ENCOUNTER SYMPTOMS
WHEEZING: 0
BACK PAIN: 0
SHORTNESS OF BREATH: 0
ABDOMINAL PAIN: 0
NAUSEA: 0
COLOR CHANGE: 0
COUGH: 0
EYE REDNESS: 0
APNEA: 0
CONSTIPATION: 0
VOMITING: 0

## 2023-10-20 NOTE — PATIENT INSTRUCTIONS
Estrogen cream: Place a pea-sized amount vaginally and to entire vestibule nightly x 1 months, then use 3 nights per week thereafter. Do NOT use plastic applicator.

## 2023-10-20 NOTE — PROGRESS NOTES
Bladderscan performed in office today:  Pt voided - 30 mL, PVR - 45 mL
Outpatient Medications on File Prior to Visit   Medication Sig Dispense Refill    calcium carbonate (OSCAL) 500 MG TABS tablet Take 1 tablet by mouth daily      Cholecalciferol (VITAMIN D3) 1.25 MG (75899 UT) CAPS Take by mouth      albuterol sulfate  (90 Base) MCG/ACT inhaler Inhale 2 puffs into the lungs every 6 hours as needed for Wheezing      atorvastatin (LIPITOR) 10 MG tablet Take 1 tablet by mouth daily      dilTIAZem HCl ER Coated Beads 360 MG TB24 Take by mouth daily       montelukast (SINGULAIR) 10 MG tablet Take 1 tablet by mouth nightly      omeprazole (PRILOSEC) 20 MG delayed release capsule Take 1 capsule by mouth in the morning and at bedtime      PARoxetine (PAXIL) 10 MG tablet Take 1 tablet by mouth every morning       No current facility-administered medications on file prior to visit. Avelox [moxifloxacin], Bactrim [sulfamethoxazole-trimethoprim], Celecoxib, Other, Sulfa antibiotics, and Diclofenac sodium  Family History   Problem Relation Age of Onset    Kidney Disease Father     Heart Disease Father     Dementia Mother     Cancer Paternal Aunt     Cancer Paternal Uncle     Cancer Brother     Other Brother     Cancer Daughter     Breast Cancer Daughter      Social History     Tobacco Use   Smoking Status Never   Smokeless Tobacco Never       Social History     Substance and Sexual Activity   Alcohol Use Not Currently       Review of Systems   Constitutional:  Negative for appetite change, chills and fever. Eyes:  Negative for redness and visual disturbance. Respiratory:  Negative for apnea, cough, shortness of breath and wheezing. Cardiovascular:  Negative for chest pain and leg swelling. Gastrointestinal:  Negative for abdominal pain, constipation, nausea and vomiting. Genitourinary:  Positive for hematuria. Negative for difficulty urinating, dyspareunia, dysuria, enuresis, flank pain, frequency, pelvic pain, urgency, vaginal bleeding and vaginal discharge.

## 2023-10-21 LAB
MICROORGANISM SPEC CULT: NORMAL
SPECIMEN DESCRIPTION: NORMAL

## 2023-10-23 ENCOUNTER — TELEPHONE (OUTPATIENT)
Dept: UROLOGY | Age: 73
End: 2023-10-23

## 2023-10-23 NOTE — TELEPHONE ENCOUNTER
----- Message from TRENT Mims - CNP sent at 10/23/2023  9:01 AM EDT -----  Call pt - urine cx reviewed and negative for UTI

## 2023-10-27 ENCOUNTER — HOSPITAL ENCOUNTER (OUTPATIENT)
Age: 73
Discharge: HOME | End: 2023-10-27
Payer: MEDICARE

## 2023-10-27 ENCOUNTER — HOSPITAL ENCOUNTER (OUTPATIENT)
Dept: CT IMAGING | Age: 73
End: 2023-10-27
Payer: MEDICARE

## 2023-10-27 DIAGNOSIS — R31.0 GROSS HEMATURIA: ICD-10-CM

## 2023-10-27 LAB
BUN SERPL-MCNC: 24 MG/DL (ref 8–23)
CREAT SERPL-MCNC: 0.8 MG/DL (ref 0.5–0.9)
GFR SERPL CREATININE-BSD FRML MDRD: >60 ML/MIN/1.73M2

## 2023-10-27 PROCEDURE — 74178 CT ABD&PLV WO CNTR FLWD CNTR: CPT | Performed by: NURSE PRACTITIONER

## 2023-10-27 PROCEDURE — 6360000004 HC RX CONTRAST MEDICATION: Performed by: NURSE PRACTITIONER

## 2023-10-27 PROCEDURE — 84520 ASSAY OF UREA NITROGEN: CPT

## 2023-10-27 PROCEDURE — 36415 COLL VENOUS BLD VENIPUNCTURE: CPT

## 2023-10-27 PROCEDURE — 82565 ASSAY OF CREATININE: CPT

## 2023-10-27 RX ADMIN — IOPAMIDOL 120 ML: 755 INJECTION, SOLUTION INTRAVENOUS at 14:55

## 2023-11-02 ENCOUNTER — PROCEDURE VISIT (OUTPATIENT)
Dept: UROLOGY | Age: 73
End: 2023-11-02
Payer: MEDICARE

## 2023-11-02 VITALS
DIASTOLIC BLOOD PRESSURE: 68 MMHG | HEART RATE: 80 BPM | WEIGHT: 131 LBS | BODY MASS INDEX: 22.36 KG/M2 | HEIGHT: 64 IN | TEMPERATURE: 96.9 F | SYSTOLIC BLOOD PRESSURE: 153 MMHG

## 2023-11-02 DIAGNOSIS — N32.81 OAB (OVERACTIVE BLADDER): ICD-10-CM

## 2023-11-02 DIAGNOSIS — R31.0 GROSS HEMATURIA: Primary | ICD-10-CM

## 2023-11-02 DIAGNOSIS — N20.0 RENAL STONES: ICD-10-CM

## 2023-11-02 DIAGNOSIS — N39.41 URGE INCONTINENCE: ICD-10-CM

## 2023-11-02 PROCEDURE — 0509F URINE INCON PLAN DOCD: CPT | Performed by: UROLOGY

## 2023-11-02 PROCEDURE — 1036F TOBACCO NON-USER: CPT | Performed by: UROLOGY

## 2023-11-02 PROCEDURE — G8420 CALC BMI NORM PARAMETERS: HCPCS | Performed by: UROLOGY

## 2023-11-02 PROCEDURE — G8427 DOCREV CUR MEDS BY ELIG CLIN: HCPCS | Performed by: UROLOGY

## 2023-11-02 PROCEDURE — 1090F PRES/ABSN URINE INCON ASSESS: CPT | Performed by: UROLOGY

## 2023-11-02 PROCEDURE — 52000 CYSTOURETHROSCOPY: CPT | Performed by: UROLOGY

## 2023-11-02 PROCEDURE — 99214 OFFICE O/P EST MOD 30 MIN: CPT | Performed by: UROLOGY

## 2023-11-02 PROCEDURE — 1123F ACP DISCUSS/DSCN MKR DOCD: CPT | Performed by: UROLOGY

## 2023-11-02 PROCEDURE — G8400 PT W/DXA NO RESULTS DOC: HCPCS | Performed by: UROLOGY

## 2023-11-02 PROCEDURE — 3017F COLORECTAL CA SCREEN DOC REV: CPT | Performed by: UROLOGY

## 2023-11-02 PROCEDURE — G8484 FLU IMMUNIZE NO ADMIN: HCPCS | Performed by: UROLOGY

## 2023-11-02 ASSESSMENT — ENCOUNTER SYMPTOMS
CONSTIPATION: 0
WHEEZING: 0
APNEA: 0
VOMITING: 0
NAUSEA: 0
SHORTNESS OF BREATH: 0
BACK PAIN: 0
EYE REDNESS: 0
COLOR CHANGE: 0
COUGH: 0
ABDOMINAL PAIN: 0

## 2023-11-02 NOTE — PATIENT INSTRUCTIONS
Hpi Title: Evaluation of Skin Lesions SURVEY:    You may be receiving a survey from [employer] regarding your visit today. Please complete the survey to enable us to provide the highest quality of care to you and your family. If you cannot score us a very good on any question, please call the office to discuss how we could have made your experience a very good one. Thank you. How Severe Are Your Spot(S)?: mild

## 2023-11-02 NOTE — PROGRESS NOTES
During cystoscopy the following was utilized on patient with no adverse affects:    45% SODIUM CHLORIDE 500 ML BAG  Lot number: [id number]  Expiration date: 10/2024      LIDOCAINE HYDROCHLORIDE JELLY 2%   Lot number: [id number]  Expiration date: 03/2025
unlabored  Cardiovascular:  Normal peripheral pulses  Abdomen: Soft, non-tender, non-distended with no organomegaly or palpable masses. : No CVA tenderness. Bladder non-tender and not distended. Pelvic: Deferred    Lab Results   Component Value Date    BUN 24 (H) 10/27/2023     Lab Results   Component Value Date    CREATININE 0.8 10/27/2023       ASSESSMENT:  This is a 68 y.o. female with the following diagnoses:   Diagnosis Orders   1. Gross hematuria  SC CYSTOURETHROSCOPY      2. OAB (overactive bladder)        3. Urge incontinence        4. Renal stones               PLAN:  Patient is clear from gross hematuria standpoint. We will order a KUB next year for stone follow-up.

## 2024-02-01 NOTE — PROGRESS NOTES
HPI:        Patient is a 73 y.o. female in no acute distress.  She is alert and oriented to person, place, and time.        istory  Urine cultures  12/2021 - e.coli  11/15/2021 - citrobacter  11/8/2021 - e.coli  7/23/2021 - e.coli  7/13/2021 - e.coli     At baseline she does have frequency every 2 hours, nocturia x3-4 (new since July), and UUI. She denies MARKO. She does wear one pad per day. She denies gross hematuria. She does have daily BMs. She does have one cup of coffee in the mornings. Prior urology history reviewed and summarized below:     8/1975 cystoscopy (microscopic hematuria)     5/1976 Hysterectomy (uterus and cervix only)     2/1981 Bladder suspension and cystoscopy by Dr. Prince     3/1993 Cystoscopy, \"stretched tubes\" by Dr. 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 Bilateral mastectomy and radiation - Dr. Gordon     12/1998 Cystoscopy, \"found to have medullary sponge kidney on left\", chronic cystitis, cystocele     1/2022 Referral from KEZIA NEWMAN for frequency UTIs. When she develops a UTI she does complain of pain with urination and bladder spasms.               Started trospium QHS     2/2022 microscopic hematuria.  CT urogram showed bladder wall thickening and a small stone in the left kidney.  Cystoscopy showed normal anatomy.  Pelvic showed vaginal atrophy.  Continued to have nocturia 3-4 times per night despite trospium.              She will take trospium at 6-7 at night                 Stop fluids 2 hours prior to bedtime    11/2023 cystoscopy negative for gross hematuria.     Currently  Patient is here today for 3-month follow-up.  Patient is here for annual stone follow-up.  Patient does state that she has stopped taking vaginal estrogen.  She felt like she was experiencing vaginal pain and abdominal bloating.  She is taking the trospium that has worked to decrease her frequency of urination.  She has no recent gross hematuria or dysuria.  She does have a daily bowel movement.

## 2024-02-02 ENCOUNTER — OFFICE VISIT (OUTPATIENT)
Dept: UROLOGY | Age: 74
End: 2024-02-02
Payer: MEDICARE

## 2024-02-02 VITALS
BODY MASS INDEX: 22.31 KG/M2 | TEMPERATURE: 97.5 F | WEIGHT: 130 LBS | DIASTOLIC BLOOD PRESSURE: 68 MMHG | SYSTOLIC BLOOD PRESSURE: 136 MMHG

## 2024-02-02 DIAGNOSIS — N39.41 URGE INCONTINENCE: ICD-10-CM

## 2024-02-02 DIAGNOSIS — R35.1 NOCTURIA: ICD-10-CM

## 2024-02-02 DIAGNOSIS — N39.0 FREQUENT UTI: ICD-10-CM

## 2024-02-02 DIAGNOSIS — N20.0 RENAL STONES: ICD-10-CM

## 2024-02-02 DIAGNOSIS — N32.81 OAB (OVERACTIVE BLADDER): Primary | ICD-10-CM

## 2024-02-02 PROCEDURE — 51798 US URINE CAPACITY MEASURE: CPT | Performed by: UROLOGY

## 2024-02-02 ASSESSMENT — ENCOUNTER SYMPTOMS
NAUSEA: 0
VOMITING: 0
COLOR CHANGE: 0
BACK PAIN: 0
APNEA: 0
SHORTNESS OF BREATH: 0
EYE REDNESS: 0
CONSTIPATION: 0
WHEEZING: 0
ABDOMINAL PAIN: 0
COUGH: 0

## 2024-02-02 NOTE — PATIENT INSTRUCTIONS
Survey:    You may be receiving a survey from [employer] regarding your visit today.    Please complete the survey to enable us to provide the highest quality of care to you and your family.    If you cannot score us as very good on any question, please call the office to discuss how we could have major experience exceptional.    Thank you    Your Urology Care Team.     see above

## 2024-10-01 ENCOUNTER — HOSPITAL ENCOUNTER (OUTPATIENT)
Dept: GENERAL RADIOLOGY | Age: 74
Discharge: HOME | End: 2024-10-03
Payer: MEDICARE

## 2024-10-01 ENCOUNTER — HOSPITAL ENCOUNTER (OUTPATIENT)
Age: 74
Discharge: HOME | End: 2024-10-03
Payer: MEDICARE

## 2024-10-01 PROCEDURE — 74018 RADEX ABDOMEN 1 VIEW: CPT

## 2024-10-11 ENCOUNTER — OFFICE VISIT (OUTPATIENT)
Dept: UROLOGY | Age: 74
End: 2024-10-11

## 2024-10-11 ENCOUNTER — HOSPITAL ENCOUNTER (OUTPATIENT)
Age: 74
Setting detail: SPECIMEN
Discharge: HOME | End: 2024-10-11
Payer: MEDICARE

## 2024-10-11 VITALS
DIASTOLIC BLOOD PRESSURE: 62 MMHG | SYSTOLIC BLOOD PRESSURE: 138 MMHG | TEMPERATURE: 97.3 F | BODY MASS INDEX: 23.79 KG/M2 | WEIGHT: 138.6 LBS

## 2024-10-11 DIAGNOSIS — N20.0 RENAL STONES: ICD-10-CM

## 2024-10-11 DIAGNOSIS — R35.1 NOCTURIA: ICD-10-CM

## 2024-10-11 DIAGNOSIS — R31.0 GROSS HEMATURIA: Primary | ICD-10-CM

## 2024-10-11 DIAGNOSIS — R31.29 MICROSCOPIC HEMATURIA: ICD-10-CM

## 2024-10-11 DIAGNOSIS — R31.0 GROSS HEMATURIA: ICD-10-CM

## 2024-10-11 LAB
BILIRUB UR QL STRIP: NEGATIVE
CLARITY UR: CLEAR
COLOR UR: YELLOW
EPI CELLS #/AREA URNS HPF: ABNORMAL /HPF (ref 0–25)
GLUCOSE UR STRIP-MCNC: NEGATIVE MG/DL
HGB UR QL STRIP.AUTO: ABNORMAL
KETONES UR STRIP-MCNC: NEGATIVE MG/DL
LEUKOCYTE ESTERASE UR QL STRIP: NEGATIVE
NITRITE UR QL STRIP: NEGATIVE
PH UR STRIP: 6 [PH] (ref 5–9)
PROT UR STRIP-MCNC: NEGATIVE MG/DL
RBC #/AREA URNS HPF: ABNORMAL /HPF (ref 0–2)
SP GR UR STRIP: 1.02 (ref 1.01–1.02)
UROBILINOGEN UR STRIP-ACNC: NORMAL EU/DL (ref 0–1)
WBC #/AREA URNS HPF: ABNORMAL /HPF (ref 0–5)

## 2024-10-11 PROCEDURE — 81001 URINALYSIS AUTO W/SCOPE: CPT

## 2024-10-11 NOTE — PROGRESS NOTES
History  Urine cultures  10/2023 - no growth  3/2023 - no growth  12/2021 - e.coli  11/15/2021 - citrobacter  11/8/2021 - e.coli  7/23/2021 - e.coli  7/13/2021 - e.coli     At baseline she does have frequency every 2 hours, nocturia x3-4 (new since July), and UUI. She denies MARKO. She does wear one pad per day. She denies gross hematuria. She does have daily BMs. She does have one cup of coffee in the mornings. Prior urology history reviewed and summarized below:     8/1975 cystoscopy (microscopic hematuria)     5/1976 Hysterectomy (uterus and cervix only)     2/1981 Bladder suspension and cystoscopy by Dr. Prince     3/1993 Cystoscopy, \"stretched tubes\" by Dr. 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 Bilateral mastectomy and radiation - Dr. Gordon     12/1998 Cystoscopy, \"found to have medullary sponge kidney on left\", chronic cystitis, cystocele     1/2022 Referral from KEZIA NEWMAN for frequency UTIs. When she develops a UTI she does complain of pain with urination and bladder spasms.               Started trospium QHS     2/2022 microscopic hematuria.  CT urogram showed bladder wall thickening and a small stone in the left kidney.  Cystoscopy showed normal anatomy.  Pelvic showed vaginal atrophy.  Continued to have nocturia 3-4 times per night despite trospium.              She will take trospium at 6-7 at night                 Stop fluids 2 hours prior to bedtime    11/2023 cystoscopy negative for gross hematuria.    Today  Here today to follow-up for stones, hematuria, and nocturia.  She denies any dysuria, gross hematuria, flank or abdominal pain.  She did have a KUB completed prior to today's visit.  This film was independently reviewed and shows no evidence of  calcifications.  She is taking trospium in the evening.  She is also wearing her BiPAP machine at night.  She denies any nocturia.  She denies daytime frequency, urgency or incontinence.    Plan  Stop trospium since she is not wearing the

## 2024-10-14 ENCOUNTER — TELEPHONE (OUTPATIENT)
Dept: UROLOGY | Age: 74
End: 2024-10-14

## 2024-10-14 NOTE — TELEPHONE ENCOUNTER
----- Message from TRENT Benitez - CNP sent at 10/14/2024  9:44 AM EDT -----  Call pt - UA reviewed and negative for signs of UTI & for significant microhematuria